# Patient Record
(demographics unavailable — no encounter records)

---

## 2025-01-15 NOTE — PLAN
[FreeTextEntry1] : 1-15-25 Plan: sacral wound stage 4 pressure injury Wash wound with Dakins 1/4 strength Packing with Dakins WTD daily. Cover with ABD pad and tape. zinc to periwound Optimization of nutrition. Offloading to the wound site Group II Pressure relief mattress ordered Roho cushion ordered Nursing orders given  Xray of the sacrum (home xray) to eval for OM ordered The patient's daughter  was counseled as to the signs and symptoms of infection, and instructed in the event they suspect new or worsening infection or if the patient is significantly ill (fever, altered mental status, etc.), to present to the nearest ED.  MD access form given with telephone number to call for referral. for palliative care f/u in 2-3 weeks

## 2025-01-15 NOTE — REASON FOR VISIT
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

## 2025-01-15 NOTE — ASSESSMENT
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 s/p excisional debridement of muscle. No s/s of infection.

## 2025-01-15 NOTE — HISTORY OF PRESENT ILLNESS
[FreeTextEntry1] : 1-15-25 Ms. Smalls  presents to the office with a wound for 6 months duration. Patient has hx of rapidly advancing Parkinson's disease. She is bed bound.  The wound is located on  the sacrum. She has a hospital bed but does not have an air mattress. They do not have a cushion for the wheelchair. They have a home nurse who comes 3x's a week but according to daughter does not order supplies for them due to issues with insurance. Her appetite is decreased and she has swallowing difficulty.   She experiences some discomfort with dressing changes. They were doing a combination of medihoney and santyl to the wound. No fever or chills at present.

## 2025-01-15 NOTE — PHYSICAL EXAM
[Calm] : calm [Skin Ulcer] : ulcer [Please See PDF for [employer]] : Please See PDF for [employer]. [de-identified] : well developed, no acute distress, frail and cachectic  [de-identified] : normocephalic, atraumatic [de-identified] : supple [de-identified] :  symmetrical rise and fall of chest wall [de-identified] : soft, non-tender [de-identified] : sacrum: wound with necrotic black eschar and wound base with fibrinous exudate, palpable and exposed bone. [de-identified] : unable to assess